# Patient Record
Sex: FEMALE
[De-identification: names, ages, dates, MRNs, and addresses within clinical notes are randomized per-mention and may not be internally consistent; named-entity substitution may affect disease eponyms.]

---

## 2021-04-15 PROBLEM — Z00.00 ENCOUNTER FOR PREVENTIVE HEALTH EXAMINATION: Status: ACTIVE | Noted: 2021-04-15

## 2021-04-16 ENCOUNTER — APPOINTMENT (OUTPATIENT)
Dept: ORTHOPEDIC SURGERY | Facility: CLINIC | Age: 46
End: 2021-04-16
Payer: COMMERCIAL

## 2021-04-16 VITALS
HEIGHT: 60 IN | SYSTOLIC BLOOD PRESSURE: 115 MMHG | DIASTOLIC BLOOD PRESSURE: 78 MMHG | HEART RATE: 71 BPM | BODY MASS INDEX: 30.43 KG/M2 | WEIGHT: 155 LBS

## 2021-04-16 DIAGNOSIS — S90.121A CONTUSION OF RIGHT LESSER TOE(S) W/OUT DAMAGE TO NAIL, INITIAL ENCOUNTER: ICD-10-CM

## 2021-04-16 DIAGNOSIS — M79.674 PAIN IN RIGHT TOE(S): ICD-10-CM

## 2021-04-16 PROCEDURE — 99072 ADDL SUPL MATRL&STAF TM PHE: CPT

## 2021-04-16 PROCEDURE — 73630 X-RAY EXAM OF FOOT: CPT | Mod: RT

## 2021-04-16 PROCEDURE — 99203 OFFICE O/P NEW LOW 30 MIN: CPT

## 2021-04-16 NOTE — HISTORY OF PRESENT ILLNESS
[FreeTextEntry1] : This is a 46F w/ no PMHx who is presenting for evaluation of right little toe pain which began 2 days ago on 4/14/2021 she jammed her little toe.  She says her swelling has improved during this time.  Currently has no pain at rest and with light walking, but up to 5 out of 10 pain at night.  She has not tried any pain medication.

## 2021-04-16 NOTE — PHYSICAL EXAM
[FreeTextEntry1] : General: well nourished, in no acute distress, alert and oriented to person, place and time.\par Psychiatric: normal mood and affect, no abnormal movements or speech patterns.\par Eyes: vision intact without deficits, sclera and conjunctiva were normal, pupils were equal in size. \par ENT: Ears and nose were normal in appearance. No thyromegaly.\par Lymph: no enlarged nodes, no lymphedema in extremity.\par Respiratory: Normal respiratory rhythm and effort. No wheezing detected without auscultation. No shortness of breath or respiratory distress.\par Cardiac: no cardiac related leg swelling.\par Neurology: normal gross sensation in extremities to light touch.\par Abdomen: soft, non-tender, tympanic, no masses.\par \par RLE:\par \par Skin: Clean, dry, small superficial scraping over the little toe. \par Inspection: No obvious malalignment, no swelling, no effusion\par Tenderness: No ttp over achilles insertion, No tenderness over the lateral malleolus, ATFL, medial malleolus, deltoid ligament. No tenderness proximal fibula. No tenderness about heel, no pain with heel squeeze.\par +TTP over little toe.\par ROM: dorsi flexion 20, degrees plantar flexion 40° normal subtalar motion. \par Stability: Negative anterior/posterior drawer.\par Strength: 5/5 TA/GS/EHL\par Sensation: Intact to light touch in dp/sp/tib/delores/saph distributions\par Pulses: 2+ DP/PT pulses\par \par Gait:\par The patient walks with a nonantalgic gait.

## 2021-04-16 NOTE — DATA REVIEWED
[de-identified] : 4/16/2021–right foot x-rays (AP, lateral, oblique): There are no obvious fractures, dislocations, or osseous lesions.

## 2021-04-16 NOTE — DISCUSSION/SUMMARY
[de-identified] : This is a 46-year-old female who likely sustained a right little toe contusion.  This should heal with time.  I recommended weightbearing as tolerated and given a flat rigid shoe for support as needed.  She may take NSAIDs as needed and maintain elevation to minimize swelling.  May follow-up in 6 weeks if she continues to have symptoms.